# Patient Record
Sex: MALE | Race: WHITE | NOT HISPANIC OR LATINO
[De-identification: names, ages, dates, MRNs, and addresses within clinical notes are randomized per-mention and may not be internally consistent; named-entity substitution may affect disease eponyms.]

---

## 2023-07-09 ENCOUNTER — TRANSCRIPTION ENCOUNTER (OUTPATIENT)
Age: 58
End: 2023-07-09

## 2023-08-16 ENCOUNTER — TRANSCRIPTION ENCOUNTER (OUTPATIENT)
Age: 58
End: 2023-08-16

## 2023-09-26 ENCOUNTER — TRANSCRIPTION ENCOUNTER (OUTPATIENT)
Age: 58
End: 2023-09-26

## 2023-10-06 ENCOUNTER — TRANSCRIPTION ENCOUNTER (OUTPATIENT)
Age: 58
End: 2023-10-06

## 2023-11-17 ENCOUNTER — TRANSCRIPTION ENCOUNTER (OUTPATIENT)
Age: 58
End: 2023-11-17

## 2023-11-27 ENCOUNTER — APPOINTMENT (OUTPATIENT)
Dept: NEPHROLOGY | Facility: CLINIC | Age: 58
End: 2023-11-27
Payer: MEDICARE

## 2023-11-27 VITALS
DIASTOLIC BLOOD PRESSURE: 96 MMHG | HEART RATE: 99 BPM | SYSTOLIC BLOOD PRESSURE: 146 MMHG | BODY MASS INDEX: 31.14 KG/M2 | HEIGHT: 70.5 IN | WEIGHT: 220 LBS | OXYGEN SATURATION: 96 %

## 2023-11-27 DIAGNOSIS — Z86.79 PERSONAL HISTORY OF OTHER DISEASES OF THE CIRCULATORY SYSTEM: ICD-10-CM

## 2023-11-27 PROBLEM — Z00.00 ENCOUNTER FOR PREVENTIVE HEALTH EXAMINATION: Status: ACTIVE | Noted: 2023-11-27

## 2023-11-27 PROCEDURE — 99215 OFFICE O/P EST HI 40 MIN: CPT

## 2023-11-27 RX ORDER — SEVELAMER HYDROCHLORIDE 800 MG/1
800 TABLET, FILM COATED ORAL 3 TIMES DAILY
Qty: 270 | Refills: 3 | Status: ACTIVE | COMMUNITY
Start: 2023-11-27 | End: 1900-01-01

## 2024-01-03 ENCOUNTER — APPOINTMENT (OUTPATIENT)
Dept: NEPHROLOGY | Facility: CLINIC | Age: 59
End: 2024-01-03
Payer: MEDICARE

## 2024-01-03 ENCOUNTER — NON-APPOINTMENT (OUTPATIENT)
Age: 59
End: 2024-01-03

## 2024-01-03 ENCOUNTER — TRANSCRIPTION ENCOUNTER (OUTPATIENT)
Age: 59
End: 2024-01-03

## 2024-01-03 VITALS
OXYGEN SATURATION: 96 % | HEART RATE: 98 BPM | BODY MASS INDEX: 30.58 KG/M2 | WEIGHT: 216 LBS | DIASTOLIC BLOOD PRESSURE: 90 MMHG | HEIGHT: 70.5 IN | SYSTOLIC BLOOD PRESSURE: 130 MMHG

## 2024-01-03 PROCEDURE — 99214 OFFICE O/P EST MOD 30 MIN: CPT

## 2024-01-03 RX ORDER — NICOTINE 21 MG/24HR
14 PATCH, TRANSDERMAL 24 HOURS TRANSDERMAL DAILY
Qty: 90 | Refills: 3 | Status: DISCONTINUED | COMMUNITY
Start: 2023-11-27 | End: 2024-01-03

## 2024-01-03 NOTE — PHYSICAL EXAM
[General Appearance - Alert] : alert [General Appearance - In No Acute Distress] : in no acute distress [General Appearance - Well Nourished] : well nourished [] : no respiratory distress [Respiration, Rhythm And Depth] : normal respiratory rhythm and effort [Exaggerated Use Of Accessory Muscles For Inspiration] : no accessory muscle use [FreeTextEntry1] : inspiratory rhonchi

## 2024-01-03 NOTE — REVIEW OF SYSTEMS
[Cough] : cough [Fever] : no fever [Chills] : no chills [Feeling Poorly] : not feeling poorly [Feeling Tired] : not feeling tired [Chest Pain] : no chest pain [Palpitations] : no palpitations [SOB on Exertion] : no shortness of breath during exertion [Abdominal Pain] : no abdominal pain [Vomiting] : no vomiting [Constipation] : no constipation [Diarrhea] : no diarrhea [Dysuria] : no dysuria [Incontinence] : no incontinence [Hesitancy] : no urinary hesitancy [Nocturia] : no nocturia [Confused] : no confusion

## 2024-01-03 NOTE — HISTORY OF PRESENT ILLNESS
[FreeTextEntry1] : 57-year-old male with PMH of CKD 4-5, BPH, chronic urinary retention with hydronephrosis, DI, and schizophrenia. Patient was admitted in Psych unit then developed diarrhea, weakness x 4 days and 1 episode of vomiting yesterday. Lab with leukocytosis, elevated lactate 3.4, bun 74, cr 4.1. abdominal CT with enteric thickening, mult right renal cyst and distended bladder. Patient was started on antibiotic and IVF. Renal was  consulted further recommendation.  Patient stated has kidney disease for about 4 years, have had chronic catheter James placed in the past. He is following with a nephrologist Kerry Perrin in [place]. Pateint denies urinary retention, dysuria or hematuria. He reported feeling well now, diarrhea subsided and he eating well.  During hospital stay he had episode of hypernatremia with serum sodium 150s. Improved with D5w, amiloride with low sodium and low protein diet.   Patient was discharge on 11/17 11/27  Here today for follow up, seen in company of mother-in-law. He has no complaints. Patient is a resident at the assisted living facility, [facility] in [place]. He reported urinating a lot and have been also drinking a lot of water. Denies urinary retention, sensation of incomplete bladder emptying. Patient use to follow with Dr. Rainey, Dr. Sweeney and Dr. Perrin, but now would like to follow in our office.  He has been off Lithium for more than 5 years. smoke 7-10 cigarettes a day   RBUS 9/5 FINDINGS: Right kidney: 10.8 cm in length. Increased echogenicity. 2.4 cm simple cyst. Dilated renal pelvis but no overt hydronephrosis. Left kidney: 11.7 cm in length. Severe hydronephrosis similar to CT. Increased renal echogenicity. Renal cortical thinning. Urinary bladder: Markedly distended with trabeculated wall and small diverticuli. No filling defects. Prevoid urinary bladder volume: 961 cc.  Postvoid urinary bladder volume: 145 cc. Prostate gland: 3.6 x 3.1 x 4.2 cm, calculated volume of 24 cc.  IMPRESSION:  Distended urinary bladder with bladder trabeculations and small bladder diverticuli. Large urinary bladder postvoid residual volume. Severe left hydronephrosis. Increased renal echogenicity compatible with medical renal disease.  1/3  Seen today with stepmother. He has no complaints, but stepmother reported coughing for about 3 days. Has no fever, cp, or sob.  He saw urologist Dr. Lopez, had US, was told that everything is okay. Result not available to me.  Kidney function stable.   Last visit referral given for transplant evaluation; I think Haroon will do better with a renal transplant than with dialysis (sitting 3-4 hrs treatment 3 times a week) as he is compliant with medication, takes his pills when given to him. I spoke to his Psychiatrist Dr. Lyles who is also think the same. He had appointment but decline evaluation. Today he stated that he would prefer to start HD instead of a renal transplant. He stated that he understands that dialysis is to clean his blood but does not know what a renal transplant is and stated, " I don't want to know, and I don't want it" and tried to walk out of the room.  He would like to have more time to think about vascular evaluation for HD access.

## 2024-02-02 ENCOUNTER — RESULT REVIEW (OUTPATIENT)
Age: 59
End: 2024-02-02

## 2024-03-27 ENCOUNTER — RESULT REVIEW (OUTPATIENT)
Age: 59
End: 2024-03-27

## 2024-04-03 ENCOUNTER — APPOINTMENT (OUTPATIENT)
Dept: NEPHROLOGY | Facility: CLINIC | Age: 59
End: 2024-04-03
Payer: MEDICARE

## 2024-04-03 VITALS
DIASTOLIC BLOOD PRESSURE: 90 MMHG | OXYGEN SATURATION: 98 % | HEIGHT: 70.5 IN | BODY MASS INDEX: 30.58 KG/M2 | HEART RATE: 114 BPM | SYSTOLIC BLOOD PRESSURE: 150 MMHG | WEIGHT: 216 LBS

## 2024-04-03 DIAGNOSIS — K21.9 GASTRO-ESOPHAGEAL REFLUX DISEASE W/OUT ESOPHAGITIS: ICD-10-CM

## 2024-04-03 DIAGNOSIS — F17.200 NICOTINE DEPENDENCE, UNSPECIFIED, UNCOMPLICATED: ICD-10-CM

## 2024-04-03 PROCEDURE — G2211 COMPLEX E/M VISIT ADD ON: CPT

## 2024-04-03 PROCEDURE — 99215 OFFICE O/P EST HI 40 MIN: CPT

## 2024-04-03 RX ORDER — SODIUM BICARBONATE 650 MG/1
650 TABLET ORAL 3 TIMES DAILY
Qty: 540 | Refills: 1 | Status: ACTIVE | COMMUNITY
Start: 2023-11-27 | End: 1900-01-01

## 2024-04-03 RX ORDER — FAMOTIDINE 20 MG/1
20 TABLET, FILM COATED ORAL
Qty: 90 | Refills: 0 | Status: ACTIVE | COMMUNITY
Start: 2023-11-28 | End: 1900-01-01

## 2024-04-03 RX ORDER — CLOZAPINE 50 MG/1
50 TABLET ORAL TWICE DAILY
Refills: 0 | Status: ACTIVE | COMMUNITY

## 2024-04-03 RX ORDER — SODIUM ZIRCONIUM CYCLOSILICATE 10 G/10G
10 POWDER, FOR SUSPENSION ORAL
Refills: 0 | Status: ACTIVE | COMMUNITY

## 2024-04-03 RX ORDER — TAMSULOSIN HYDROCHLORIDE 0.4 MG/1
0.4 CAPSULE ORAL
Refills: 0 | Status: ACTIVE | COMMUNITY

## 2024-04-03 RX ORDER — POLYETHYLENE GLYCOL 3350 17 G/17G
17 POWDER, FOR SOLUTION ORAL
Refills: 0 | Status: ACTIVE | COMMUNITY

## 2024-04-03 RX ORDER — CALCIUM ACETATE 667 MG/1
667 CAPSULE ORAL 3 TIMES DAILY
Refills: 0 | Status: ACTIVE | COMMUNITY

## 2024-04-03 RX ORDER — ATORVASTATIN CALCIUM 20 MG/1
20 TABLET, FILM COATED ORAL
Refills: 0 | Status: ACTIVE | COMMUNITY

## 2024-04-03 RX ORDER — PANTOPRAZOLE SODIUM 40 MG/1
40 GRANULE, DELAYED RELEASE ORAL DAILY
Refills: 0 | Status: ACTIVE | COMMUNITY

## 2024-04-03 RX ORDER — RISPERIDONE 3 MG/1
3 TABLET, FILM COATED ORAL
Refills: 0 | Status: ACTIVE | COMMUNITY

## 2024-04-03 RX ORDER — SENNOSIDES 8.6 MG/1
8.6 TABLET ORAL
Refills: 0 | Status: ACTIVE | COMMUNITY

## 2024-04-03 RX ORDER — FINASTERIDE 5 MG/1
5 TABLET, FILM COATED ORAL DAILY
Refills: 0 | Status: ACTIVE | COMMUNITY

## 2024-04-03 RX ORDER — CLOZAPINE 100 MG/1
100 TABLET ORAL
Refills: 0 | Status: ACTIVE | COMMUNITY

## 2024-04-03 NOTE — HISTORY OF PRESENT ILLNESS
[FreeTextEntry1] : 57-year-old male resident at [facility] [place], with PMH of CKD 4-5, BPH, chronic urinary retention with hydronephrosis, DI, and schizophrenia. Patient was admitted in Psych unit then developed diarrhea, weakness x 4 days and 1 episode of vomiting yesterday. Lab with leukocytosis, elevated lactate 3.4, bun 74, cr 4.1. abdominal CT with enteric thickening, mult right renal cyst and distended bladder. Patient was started on antibiotic and IVF. Renal was  consulted further recommendation.  Patient stated has kidney disease for about 4 years, have had chronic catheter James placed in the past. He is following with a nephrologist Kerry Perrin in [place]. Pateint denies urinary retention, dysuria or hematuria. He reported feeling well now, diarrhea subsided and he eating well.  During hospital stay he had episode of hypernatremia with serum sodium 150s. Improved with D5w, amiloride with low sodium and low protein diet.   Patient was discharge on 11/17   Here today for follow up, seen in company of mother-in-law. He has no complaints. Patient is a resident at the assisted living facility, [facility] in [place]. He reported urinating a lot and have been also drinking a lot of water. Denies urinary retention, sensation of incomplete bladder emptying. Patient use to follow with Dr. Rainey, Dr. Sweeney and Dr. Perrin, but now would like to follow in our office.  He has been off Lithium for more than 5 years. smoke 7-10 cigarettes a day   RBUS 9/5 FINDINGS: Right kidney: 10.8 cm in length. Increased echogenicity. 2.4 cm simple cyst. Dilated renal pelvis but no overt hydronephrosis. Left kidney: 11.7 cm in length. Severe hydronephrosis similar to CT. Increased renal echogenicity. Renal cortical thinning. Urinary bladder: Markedly distended with trabeculated wall and small diverticuli. No filling defects. Prevoid urinary bladder volume: 961 cc.  Postvoid urinary bladder volume: 145 cc. Prostate gland: 3.6 x 3.1 x 4.2 cm, calculated volume of 24 cc.  IMPRESSION:  Distended urinary bladder with bladder trabeculations and small bladder diverticuli. Large urinary bladder postvoid residual volume. Severe left hydronephrosis. Increased renal echogenicity compatible with medical renal disease.  1/3  Seen today with stepmother. He has no complaints, but stepmother reported coughing for about 3 days. Has no fever, cp, or sob.  He saw urologist Dr. Lopez, had US, was told that everything is okay. Result not available to me.  Kidney function stable.   Last visit referral given for transplant evaluation; I think Haroon will do better with a renal transplant than with dialysis (sitting 3-4 hrs treatment 3 times a week) as he is compliant with medication, takes his pills when given to him. I spoke to his Psychiatrist Dr. Lyles who is also think the same. He had appointment but decline evaluation. Today he stated that he would prefer to start HD instead of a renal transplant. He stated that he understands that dialysis is to clean his blood but does not know what a renal transplant is and stated, " I don't want to know, and I don't want it" and tried to walk out of the room.  He would like to have more time to think about vascular evaluation for HD access.   4/3 Patient came today alone without his stepmom. He reported feeling well. Denies sob, cp, palpitation, n/v, dysuria, urinary retention, nocturia.  Recent lab with hypernatremia sNa 146-148. Haroon reported drinking water, could not quantify amount.  He is on amiloride 5 mg.  Kidney function stable with scr ~3s.   BP high in the office.

## 2024-04-03 NOTE — PHYSICAL EXAM
[General Appearance - Alert] : alert [General Appearance - In No Acute Distress] : in no acute distress [Respiration, Rhythm And Depth] : normal respiratory rhythm and effort [] : no respiratory distress [Exaggerated Use Of Accessory Muscles For Inspiration] : no accessory muscle use [Auscultation Breath Sounds / Voice Sounds] : lungs were clear to auscultation bilaterally [Heart Rate And Rhythm] : heart rate was normal and rhythm regular [Heart Sounds] : normal S1 and S2 [Edema] : there was no peripheral edema [Abdomen Soft] : soft [Abdomen Tenderness] : non-tender [Abnormal Walk] : normal gait [Oriented To Time, Place, And Person] : oriented to person, place, and time

## 2024-04-03 NOTE — ASSESSMENT
[FreeTextEntry1] : # CKD stage 4-5, stable Stepmother reported baseline creatinine ~ 2.5-2.9, about 1 yr ago, on hospital chart review, serum creatinine has been ~ 3-4. Likely new baseline stable. Most recent labs 3/6, scr 3.0-3.3. stable  -cause likely obstructive nephropathy, lithium induced - UA bland no hematuria or proteinuria - RBUS 9/5: Severe left hydronephrosis. Increased renal echogenicity compatible with medical renal disease. - Avoid nephrotoxic med. home med reviewed, dc PPI cont with pepcid.  - Today 4/3 Declined renal tx eval and, would like to proceed with HD when indicated. He would like to take more time to discuss vascular eval for access. Referral given last visit, decline evaluation today. - Lab , cbc, cmp, cystatin c, UA, UPCR,  # Nephrogenic DI/Hypernatremia, worsening -  Serum sodium rising 146-148 last 2 labs - Recommend increasing water intake at least 3 litter a day - Low sodium and low protein diet - increase amiloride to 10 mg daily - cont lokelma 10g daily  # Hx of Urinary retention with left Hydronephrosis Reported no retention at the visit, freely voiding. -renal US 9/5 Left HN, - cont flomax 0.4 mg daily - Seen urologist dr. Lopez last Dec. on the last visit. Stepmother stated he was told that everything was okay. should have had follow up appointment this month.   # Active smoker ~ 10 cigarettes a day. Tolerated nicotine patch on last admission. - nicotine patch 14mg/d. Reported not working, cont to smoke, he said " I like to smoke."  # Metabolic acidosis Not improving, bicarb 19 -cont sodium bicarb to 1300 mg tid. Per his med list at the assisted facility he was on 2 tab bid. Will send update med recommendation to the facility.  # CKD-MBD -cont sevelamer 800 mg daily - cont calcium acetate 667 mg daily  -Send PHos, vit D, PTH  # Elevated BP-- HTN No dx of HTN, BP elevated last visit and today 150/90, manual repeat 140/100. -low salt diet - Increase amiloride to 10 mg daily. Dose was decrease to 5 mg on last admission due to hypotensive episode.  - Home BP monitor and bring log on next visit - BP check 2 weeks. will cont to adjust med as needed.   RTC 8 weeks. Lab 1 week prior to the visit

## 2024-04-03 NOTE — REVIEW OF SYSTEMS
[Chills] : no chills [Fever] : no fever [Feeling Tired] : not feeling tired [Feeling Poorly] : not feeling poorly [Chest Pain] : no chest pain [Palpitations] : no palpitations [Cough] : no cough [SOB on Exertion] : no shortness of breath during exertion [Constipation] : no constipation [Diarrhea] : no diarrhea [Incontinence] : no incontinence [Dysuria] : no dysuria [Hesitancy] : no urinary hesitancy [Dizziness] : no dizziness [Nocturia] : no nocturia

## 2024-04-08 LAB
25(OH)D3 SERPL-MCNC: 29.7 NG/ML
ALBUMIN SERPL ELPH-MCNC: 4.4 G/DL
ALP BLD-CCNC: 127 U/L
ALT SERPL-CCNC: 13 U/L
ANION GAP SERPL CALC-SCNC: 17 MMOL/L
AST SERPL-CCNC: 11 U/L
BASOPHILS # BLD AUTO: 0.03 K/UL
BASOPHILS NFR BLD AUTO: 0.2 %
BILIRUB SERPL-MCNC: 0.3 MG/DL
BUN SERPL-MCNC: 37 MG/DL
CALCIUM SERPL-MCNC: 9.9 MG/DL
CALCIUM SERPL-MCNC: 9.9 MG/DL
CHLORIDE SERPL-SCNC: 110 MMOL/L
CO2 SERPL-SCNC: 17 MMOL/L
CREAT SERPL-MCNC: 3.43 MG/DL
CYSTATIN C SERPL-MCNC: 2.33 MG/L
EGFR: 20 ML/MIN/1.73M2
EOSINOPHIL # BLD AUTO: 0 K/UL
EOSINOPHIL NFR BLD AUTO: 0 %
GFR/BSA.PRED SERPLBLD CYS-BASED-ARV: 26 ML/MIN/1.73M2
GLUCOSE SERPL-MCNC: 113 MG/DL
HCT VFR BLD CALC: 44.3 %
HGB BLD-MCNC: 14.1 G/DL
IMM GRANULOCYTES NFR BLD AUTO: 0.8 %
LYMPHOCYTES # BLD AUTO: 0.88 K/UL
LYMPHOCYTES NFR BLD AUTO: 7.1 %
MAN DIFF?: NORMAL
MCHC RBC-ENTMCNC: 27.4 PG
MCHC RBC-ENTMCNC: 31.8 GM/DL
MCV RBC AUTO: 86 FL
MONOCYTES # BLD AUTO: 0.72 K/UL
MONOCYTES NFR BLD AUTO: 5.8 %
NEUTROPHILS # BLD AUTO: 10.64 K/UL
NEUTROPHILS NFR BLD AUTO: 86.1 %
PARATHYROID HORMONE INTACT: 149 PG/ML
PHOSPHATE SERPL-MCNC: 3 MG/DL
PLATELET # BLD AUTO: 188 K/UL
POTASSIUM SERPL-SCNC: 4.7 MMOL/L
PROT SERPL-MCNC: 6.8 G/DL
RBC # BLD: 5.15 M/UL
RBC # FLD: 15.1 %
SODIUM SERPL-SCNC: 144 MMOL/L
WBC # FLD AUTO: 12.37 K/UL

## 2024-05-01 ENCOUNTER — RESULT REVIEW (OUTPATIENT)
Age: 59
End: 2024-05-01

## 2024-05-31 ENCOUNTER — APPOINTMENT (OUTPATIENT)
Dept: NEPHROLOGY | Facility: CLINIC | Age: 59
End: 2024-05-31
Payer: MEDICARE

## 2024-05-31 VITALS
BODY MASS INDEX: 31.43 KG/M2 | OXYGEN SATURATION: 98 % | HEART RATE: 98 BPM | DIASTOLIC BLOOD PRESSURE: 88 MMHG | HEIGHT: 70.5 IN | SYSTOLIC BLOOD PRESSURE: 140 MMHG | WEIGHT: 222 LBS

## 2024-05-31 DIAGNOSIS — R33.9 RETENTION OF URINE, UNSPECIFIED: ICD-10-CM

## 2024-05-31 DIAGNOSIS — N13.30 UNSPECIFIED HYDRONEPHROSIS: ICD-10-CM

## 2024-05-31 DIAGNOSIS — N25.1 NEPHROGENIC DIABETES INSIPIDUS: ICD-10-CM

## 2024-05-31 DIAGNOSIS — E87.20 ACIDOSIS, UNSPECIFIED: ICD-10-CM

## 2024-05-31 PROCEDURE — 99214 OFFICE O/P EST MOD 30 MIN: CPT

## 2024-05-31 NOTE — REVIEW OF SYSTEMS
[Fever] : no fever [Chills] : no chills [Chest Pain] : no chest pain [Palpitations] : no palpitations [Cough] : no cough [SOB on Exertion] : no shortness of breath during exertion [Constipation] : no constipation [Diarrhea] : no diarrhea [Dysuria] : no dysuria [Hesitancy] : no urinary hesitancy [Nocturia] : no nocturia [Dizziness] : no dizziness

## 2024-05-31 NOTE — ASSESSMENT
[FreeTextEntry1] : # CKD stage 4-5, worsening Stepmother reported baseline creatinine ~ 2.5-2.9, about 1 yr ago, on hospital chart review, serum creatinine has been ~ 3-4. Since hospital discharge scr has been mostly in the 3s, progressively rise to 4.02 -cause likely obstructive nephropathy, lithium induced - UA bland no hematuria or proteinuria - RBUS 9/5: Severe left hydronephrosis. Increased renal echogenicity compatible with medical renal disease. - Avoid nephrotoxic med. home med reviewed, cont with pepcid.  -  4/3 and previous visit, he Declined renal tx eval and, would like to proceed with HD when indicated. He would like to have access place when needed.  Did not go for evaluation for vascular access. - Lab  cmp, UA.  IF serum creatinine cont to rise and patient cannot be seen by urology ealier than August, will obtain renal US to rule out urinary obstruction.  - cont to increase hydration  # Nephrogenic DI/Hypernatremia stable -  Serum sodium 141 - cont water intake 3 litter a day - Low sodium and low protein diet - cont amiloride to 10 mg daily - cont lokelma 10g daily  # Hx of Urinary retention with left Hydronephrosis Reported no retention at the visit, freely voiding. -renal US 9/5 Left HN, - cont flomax 0.4 mg daily - Seen urologist dr. Lopez last March.  Stepmother stated he was told that everything was okay, next FU August  # Active smoker ~ 10 cigarettes a day. Tolerated nicotine patch on last admission. - nicotine patch 14mg/d. Reported not working, cont to smoke, he said " I like to smoke."  # Metabolic acidosis, improving Not improving, bicarb 21 -cont sodium bicarb to 1300 mg tid.   # CKD-MBD -cont sevelamer 800 mg daily - cont calcium acetate 667 mg daily  - Phos 3.0, vit D 29,    # Elevated BP-- HTN No dx of HTN, initial /88, manual repeat 130/80  -low salt diet - cont amiloride to 10 mg daily - Home BP monitor and bring log on next visit  RTC 8 weeks. Lab 1 week prior to the visit

## 2024-05-31 NOTE — PHYSICAL EXAM
[General Appearance - Alert] : alert [General Appearance - In No Acute Distress] : in no acute distress [] : no respiratory distress [Respiration, Rhythm And Depth] : normal respiratory rhythm and effort [Exaggerated Use Of Accessory Muscles For Inspiration] : no accessory muscle use [Auscultation Breath Sounds / Voice Sounds] : lungs were clear to auscultation bilaterally [Heart Rate And Rhythm] : heart rate was normal and rhythm regular [Heart Sounds] : normal S1 and S2 [Edema] : there was no peripheral edema [Abdomen Soft] : soft [Abdomen Tenderness] : non-tender [Abnormal Walk] : normal gait

## 2024-05-31 NOTE — HISTORY OF PRESENT ILLNESS
[FreeTextEntry1] : 57-year-old male with PMH of CKD 4-5, BPH, chronic urinary retention with hydronephrosis, DI, and schizophrenia. Patient was admitted in Psych unit then developed diarrhea, weakness x 4 days and 1 episode of vomiting yesterday. Lab with leukocytosis, elevated lactate 3.4, bun 74, cr 4.1. abdominal CT with enteric thickening, mult right renal cyst and distended bladder. Patient was started on antibiotic and IVF. Renal was  consulted further recommendation.  Patient stated has kidney disease for about 4 years, have had chronic catheter James placed in the past. He is following with a nephrologist Kerry Perrin in [place]. Pateint denies urinary retention, dysuria or hematuria. He reported feeling well now, diarrhea subsided and he eating well.  During hospital stay he had episode of hypernatremia with serum sodium 150s. Improved with D5w, amiloride with low sodium and low protein diet.   Patient was discharge on 11/17   Here today for follow up, seen in company of mother-in-law. He has no complaints. Patient is a resident at the assisted living facility, [facility] in [place]. He reported urinating a lot and have been also drinking a lot of water. Denies urinary retention, sensation of incomplete bladder emptying. Patient use to follow with Dr. Rainey, Dr. Sweeney and Dr. Perrin, but now would like to follow in our office.  He has been off Lithium for more than 5 years. smoke 7-10 cigarettes a day   RBUS 9/5 FINDINGS: Right kidney: 10.8 cm in length. Increased echogenicity. 2.4 cm simple cyst. Dilated renal pelvis but no overt hydronephrosis. Left kidney: 11.7 cm in length. Severe hydronephrosis similar to CT. Increased renal echogenicity. Renal cortical thinning. Urinary bladder: Markedly distended with trabeculated wall and small diverticuli. No filling defects. Prevoid urinary bladder volume: 961 cc.  Postvoid urinary bladder volume: 145 cc. Prostate gland: 3.6 x 3.1 x 4.2 cm, calculated volume of 24 cc.  IMPRESSION:  Distended urinary bladder with bladder trabeculations and small bladder diverticuli. Large urinary bladder postvoid residual volume. Severe left hydronephrosis. Increased renal echogenicity compatible with medical renal disease.  1/3  Seen today with stepmother. He has no complaints, but stepmother reported coughing for about 3 days. Has no fever, cp, or sob.  He saw urologist Dr. Lopez, had US, was told that everything is okay. Result not available to me.  Kidney function stable.   Last visit referral given for transplant evaluation; I think Haroon will do better with a renal transplant than with dialysis (sitting 3-4 hrs treatment 3 times a week) as he is compliant with medication, takes his pills when given to him. I spoke to his Psychiatrist Dr. Lyles who is also think the same. He had appointment but decline evaluation. Today he stated that he would prefer to start HD instead of a renal transplant. He stated that he understands that dialysis is to clean his blood but does not know what a renal transplant is and stated, " I don't want to know, and I don't want it" and tried to walk out of the room.  He would like to have more time to think about vascular evaluation for HD access.   4/3 Patient came today alone without his stepmom. He reported feeling well. Denies sob, cp, palpitation, n/v, dysuria, urinary retention, nocturia.  Recent lab with hypernatremia sNa 146-148. Haroon reported drinking water, could not quantify amount.  He is on amiloride 5 mg.  Kidney function stable with scr ~3s.    4/31  Patient seen today with stepmom. he was admitted in the hospital recently for psychosis with auditory hallucinations. He was discharged about 24-48 hrs after.   Serum creatinine rising most recent 4.02 from baseline 3s. progressively rised from 3.2-3.6-3.8. Patient report feeling well. He denies change in urinary frequency or abdominal discomfort. He stated drinking water.  Not taking any new medication.  He saw Dr. Nj in March, mom reported that no urinary retention reported during evalution, had sonogram done at the visit, his next urology appointment is in August.

## 2024-06-05 DIAGNOSIS — E83.9 CHRONIC KIDNEY DISEASE, UNSPECIFIED: ICD-10-CM

## 2024-06-05 DIAGNOSIS — M89.9 CHRONIC KIDNEY DISEASE, UNSPECIFIED: ICD-10-CM

## 2024-06-05 DIAGNOSIS — N18.9 CHRONIC KIDNEY DISEASE, UNSPECIFIED: ICD-10-CM

## 2024-06-05 DIAGNOSIS — N18.5 CHRONIC KIDNEY DISEASE, STAGE 5: ICD-10-CM

## 2024-06-05 LAB
ANION GAP SERPL CALC-SCNC: 13 MMOL/L
APPEARANCE: CLEAR
BACTERIA: NEGATIVE /HPF
BILIRUBIN URINE: NEGATIVE
BLOOD URINE: NEGATIVE
BUN SERPL-MCNC: 41 MG/DL
CALCIUM SERPL-MCNC: 9.3 MG/DL
CAST: 1 /LPF
CHLORIDE SERPL-SCNC: 109 MMOL/L
CO2 SERPL-SCNC: 17 MMOL/L
COLOR: YELLOW
CREAT SERPL-MCNC: 3.38 MG/DL
EGFR: 20 ML/MIN/1.73M2
EPITHELIAL CELLS: 0 /HPF
GLUCOSE QUALITATIVE U: NEGATIVE MG/DL
GLUCOSE SERPL-MCNC: 126 MG/DL
KETONES URINE: NEGATIVE MG/DL
LEUKOCYTE ESTERASE URINE: NEGATIVE
MICROSCOPIC-UA: NORMAL
NITRITE URINE: NEGATIVE
PH URINE: 6.5
POTASSIUM SERPL-SCNC: 4.4 MMOL/L
PROTEIN URINE: NEGATIVE MG/DL
RED BLOOD CELLS URINE: 0 /HPF
SODIUM SERPL-SCNC: 139 MMOL/L
SPECIFIC GRAVITY URINE: 1.01
UROBILINOGEN URINE: 0.2 MG/DL
WHITE BLOOD CELLS URINE: 0 /HPF

## 2024-06-06 RX ORDER — AMILORIDE HYDROCHLORIDE 5 MG/1
5 TABLET ORAL
Qty: 180 | Refills: 3 | Status: ACTIVE | COMMUNITY
Start: 2023-11-27 | End: 1900-01-01

## 2024-07-03 ENCOUNTER — RESULT REVIEW (OUTPATIENT)
Age: 59
End: 2024-07-03

## 2024-07-10 ENCOUNTER — APPOINTMENT (OUTPATIENT)
Dept: NEPHROLOGY | Facility: CLINIC | Age: 59
End: 2024-07-10
Payer: MEDICARE

## 2024-07-10 VITALS
OXYGEN SATURATION: 96 % | SYSTOLIC BLOOD PRESSURE: 146 MMHG | DIASTOLIC BLOOD PRESSURE: 100 MMHG | HEART RATE: 106 BPM | WEIGHT: 220 LBS | HEIGHT: 70.5 IN | BODY MASS INDEX: 31.14 KG/M2

## 2024-07-10 DIAGNOSIS — R33.9 RETENTION OF URINE, UNSPECIFIED: ICD-10-CM

## 2024-07-10 DIAGNOSIS — E83.9 CHRONIC KIDNEY DISEASE, UNSPECIFIED: ICD-10-CM

## 2024-07-10 DIAGNOSIS — N13.30 UNSPECIFIED HYDRONEPHROSIS: ICD-10-CM

## 2024-07-10 DIAGNOSIS — E87.20 ACIDOSIS, UNSPECIFIED: ICD-10-CM

## 2024-07-10 DIAGNOSIS — M89.9 CHRONIC KIDNEY DISEASE, UNSPECIFIED: ICD-10-CM

## 2024-07-10 DIAGNOSIS — N18.9 CHRONIC KIDNEY DISEASE, UNSPECIFIED: ICD-10-CM

## 2024-07-10 DIAGNOSIS — N25.1 NEPHROGENIC DIABETES INSIPIDUS: ICD-10-CM

## 2024-07-10 DIAGNOSIS — N18.5 CHRONIC KIDNEY DISEASE, STAGE 5: ICD-10-CM

## 2024-07-10 PROCEDURE — 99214 OFFICE O/P EST MOD 30 MIN: CPT

## 2024-09-20 ENCOUNTER — APPOINTMENT (OUTPATIENT)
Dept: NEPHROLOGY | Facility: CLINIC | Age: 59
End: 2024-09-20
Payer: MEDICARE

## 2024-09-20 VITALS
WEIGHT: 225 LBS | HEART RATE: 104 BPM | HEIGHT: 70.5 IN | DIASTOLIC BLOOD PRESSURE: 110 MMHG | BODY MASS INDEX: 31.85 KG/M2 | SYSTOLIC BLOOD PRESSURE: 160 MMHG | OXYGEN SATURATION: 98 %

## 2024-09-20 DIAGNOSIS — N18.9 CHRONIC KIDNEY DISEASE, UNSPECIFIED: ICD-10-CM

## 2024-09-20 DIAGNOSIS — N25.1 NEPHROGENIC DIABETES INSIPIDUS: ICD-10-CM

## 2024-09-20 DIAGNOSIS — M89.9 CHRONIC KIDNEY DISEASE, UNSPECIFIED: ICD-10-CM

## 2024-09-20 DIAGNOSIS — E87.20 ACIDOSIS, UNSPECIFIED: ICD-10-CM

## 2024-09-20 DIAGNOSIS — E83.9 CHRONIC KIDNEY DISEASE, UNSPECIFIED: ICD-10-CM

## 2024-09-20 DIAGNOSIS — N13.30 UNSPECIFIED HYDRONEPHROSIS: ICD-10-CM

## 2024-09-20 DIAGNOSIS — N18.5 CHRONIC KIDNEY DISEASE, STAGE 5: ICD-10-CM

## 2024-09-20 PROCEDURE — 99214 OFFICE O/P EST MOD 30 MIN: CPT

## 2024-09-20 RX ORDER — CLOZAPINE 200 MG/1
200 TABLET ORAL DAILY
Refills: 0 | Status: ACTIVE | COMMUNITY

## 2024-09-20 NOTE — HISTORY OF PRESENT ILLNESS
[FreeTextEntry1] : 57-year-old male with PMH of CKD 4-5, BPH, chronic urinary retention with hydronephrosis, DI, and schizophrenia. Patient was admitted in Psych unit then developed diarrhea, weakness x 4 days and 1 episode of vomiting yesterday. Lab with leukocytosis, elevated lactate 3.4, bun 74, cr 4.1. abdominal CT with enteric thickening, mult right renal cyst and distended bladder. Patient was started on antibiotic and IVF. Renal was  consulted further recommendation.  Patient stated has kidney disease for about 4 years, have had chronic catheter James placed in the past. He is following with a nephrologist Kerry Perrin in [place]. Pateint denies urinary retention, dysuria or hematuria. He reported feeling well now, diarrhea subsided and he eating well.  During hospital stay he had episode of hypernatremia with serum sodium 150s. Improved with D5w, amiloride with low sodium and low protein diet.   Patient was discharge on 11/17   Here today for follow up, seen in company of mother-in-law. He has no complaints. Patient is a resident at the assisted living facility, [facility] in [place]. He reported urinating a lot and have been also drinking a lot of water. Denies urinary retention, sensation of incomplete bladder emptying. Patient use to follow with Dr. Rainey, Dr. Sweeney and Dr. Perrin, but now would like to follow in our office.  He has been off Lithium for more than 5 years. smoke 7-10 cigarettes a day   RBUS 9/5 FINDINGS: Right kidney: 10.8 cm in length. Increased echogenicity. 2.4 cm simple cyst. Dilated renal pelvis but no overt hydronephrosis. Left kidney: 11.7 cm in length. Severe hydronephrosis similar to CT. Increased renal echogenicity. Renal cortical thinning. Urinary bladder: Markedly distended with trabeculated wall and small diverticuli. No filling defects. Prevoid urinary bladder volume: 961 cc.  Postvoid urinary bladder volume: 145 cc. Prostate gland: 3.6 x 3.1 x 4.2 cm, calculated volume of 24 cc.  IMPRESSION:  Distended urinary bladder with bladder trabeculations and small bladder diverticuli. Large urinary bladder postvoid residual volume. Severe left hydronephrosis. Increased renal echogenicity compatible with medical renal disease.  1/3  Seen today with stepmother. He has no complaints, but stepmother reported coughing for about 3 days. Has no fever, cp, or sob.  He saw urologist Dr. Lopez, had US, was told that everything is okay. Result not available to me.  Kidney function stable.   Last visit referral given for transplant evaluation; I think Haroon will do better with a renal transplant than with dialysis (sitting 3-4 hrs treatment 3 times a week) as he is compliant with medication, takes his pills when given to him. I spoke to his Psychiatrist Dr. Lyles who is also think the same. He had appointment but decline evaluation. Today he stated that he would prefer to start HD instead of a renal transplant. He stated that he understands that dialysis is to clean his blood but does not know what a renal transplant is and stated, " I don't want to know, and I don't want it" and tried to walk out of the room.  He would like to have more time to think about vascular evaluation for HD access.   4/3 Patient came today alone without his stepmom. He reported feeling well. Denies sob, cp, palpitation, n/v, dysuria, urinary retention, nocturia.  Recent lab with hypernatremia sNa 146-148. Haroon reported drinking water, could not quantify amount.  He is on amiloride 5 mg.  Kidney function stable with scr ~3s.    5/31  Patient seen today with stepmom. he was admitted in the hospital recently for psychosis with auditory hallucinations. He was discharged about 24-48 hrs after.   Serum creatinine rising most recent 4.02 from baseline 3s. progressively rised from 3.2-3.6-3.8. Patient report feeling well. He denies change in urinary frequency or abdominal discomfort. He stated drinking water.  Not taking any new medication.  He saw Dr. Nj in March, mom reported that no urinary retention reported during evalution, had sonogram done at the visit, his next urology appointment is in August.    7/10  He is complaining of not feeling well, describe vague abdominal discomfort but denies pain. He denies diarrhea, nausea or vomiting, dysuria. His mom said he did not have lunch today. He had lab drown today, will follow up.   Lab 6/12 reviewed and discussed, kidney function stable. BP mildly elevated, manual repeat 130/75.   9/20  Patient was admitted twice in Psych unit since last visit, most recent visit this month. His clozapine dose was increased.   serum creatinine increased 3.5 from 3.3, egfr 18-19ml/mn, Na 145 Sept 11th.  He stated feeling well and has no complaint. Report voiding freely.  Per his stepmom, had urology fu recently and evaluation was unremarkable.

## 2024-09-20 NOTE — PHYSICAL EXAM
[General Appearance - Alert] : alert [General Appearance - In No Acute Distress] : in no acute distress [Heart Rate And Rhythm] : heart rate was normal and rhythm regular [Heart Sounds] : normal S1 and S2 [Murmurs] : no murmurs [Edema] : there was no peripheral edema [Abdomen Soft] : soft [Abdomen Tenderness] : non-tender [Abnormal Walk] : normal gait [Oriented To Time, Place, And Person] : oriented to person, place, and time

## 2024-09-20 NOTE — ASSESSMENT
[FreeTextEntry1] : # CKD stage 4-5, worsening -scr 3.5 from baseline 3.3 this yr. -cause likely obstructive nephropathy, lithium induced - UA bland no hematuria or proteinuria same on repeat - RBUS 9/5: Severe left hydronephrosis. Increased renal echogenicity compatible with medical renal disease.  - Avoid nephrotoxic med. home med reviewed, cont with pepcid. - 4/3 and previous visit, he Declined renal Tx eval and, would like to proceed with HD when indicated. He would like to have access place when HD is needed. Refuse evaluation for vascular access. - cont to increase hydration - Obtain updated med list from group home - lab cbc, cmp, ua, cystatin c , phos  # Nephrogenic DI/Hypernatremia stable # Hyperkalemia - Serum sodium 145 - Fluid intake reinforced, water intake ~ 3 litter a day - Low sodium and low protein diet - cont amiloride to 10 mg daily - cont lokelma 10g daily  # Hx of Urinary retention with left Hydronephrosis Reported no retention at the visit, freely voiding. -renal US 9/5/23 Left HN, - cont flomax 0.4 mg daily -Has urology fu in August, Report had urology fu with unremarkable evaluation.  Will obtain sonogram report from urology  # Active smoker ~ 10 cigarettes a day. Tolerated nicotine patch on last admission. -Previous visit, gave him nicotine patch 14mg/d. Reported not working, cont to smoke, he said " I like to smoke."  # Metabolic acidosis -cont sodium bicarb to 1300 mg tid.  # CKD-MBD -cont sevelamer 800 mg daily - cont calcium acetate 667 mg daily - Phos 3.0, vit D 29, . Repeat phos and dc calcium acetate if phos stable and calcium wnl - will obtain med   # HTN No dx of HTN, initial /110, manual repeat 140/90, did not take his med this morning,  -low salt diet - cont amiloride to 10 mg daily - Home BP monitor and bring log on next visit

## 2024-09-24 LAB
ALBUMIN SERPL ELPH-MCNC: 4.3 G/DL
ALP BLD-CCNC: 125 U/L
ALT SERPL-CCNC: 10 U/L
ANION GAP SERPL CALC-SCNC: 15 MMOL/L
APPEARANCE: CLEAR
AST SERPL-CCNC: 11 U/L
BACTERIA: NEGATIVE /HPF
BILIRUB SERPL-MCNC: 0.2 MG/DL
BILIRUBIN URINE: NEGATIVE
BLOOD URINE: NEGATIVE
BUN SERPL-MCNC: 43 MG/DL
CALCIUM SERPL-MCNC: 9.4 MG/DL
CAST: 1 /LPF
CHLORIDE SERPL-SCNC: 112 MMOL/L
CO2 SERPL-SCNC: 16 MMOL/L
COLOR: YELLOW
CREAT SERPL-MCNC: 3.44 MG/DL
CYSTATIN C SERPL-MCNC: 2.09 MG/L
EGFR: 20 ML/MIN/1.73M2
EPITHELIAL CELLS: 0 /HPF
GFR/BSA.PRED SERPLBLD CYS-BASED-ARV: 30 ML/MIN/1.73M2
GLUCOSE QUALITATIVE U: NEGATIVE MG/DL
GLUCOSE SERPL-MCNC: 165 MG/DL
HCT VFR BLD CALC: 42.5 %
HGB BLD-MCNC: 13.5 G/DL
KETONES URINE: NEGATIVE MG/DL
LEUKOCYTE ESTERASE URINE: ABNORMAL
MCHC RBC-ENTMCNC: 27.9 PG
MCHC RBC-ENTMCNC: 31.8 GM/DL
MCV RBC AUTO: 87.8 FL
MICROSCOPIC-UA: NORMAL
NITRITE URINE: NEGATIVE
PH URINE: 6.5
PHOSPHATE SERPL-MCNC: 2.7 MG/DL
PLATELET # BLD AUTO: 188 K/UL
POTASSIUM SERPL-SCNC: 4.8 MMOL/L
PROT SERPL-MCNC: 6.8 G/DL
PROTEIN URINE: NORMAL MG/DL
RBC # BLD: 4.84 M/UL
RBC # FLD: 14.2 %
RED BLOOD CELLS URINE: 0 /HPF
SODIUM SERPL-SCNC: 143 MMOL/L
SPECIFIC GRAVITY URINE: 1
UROBILINOGEN URINE: 0.2 MG/DL
WBC # FLD AUTO: 14.12 K/UL
WHITE BLOOD CELLS URINE: 1 /HPF

## 2024-09-30 DIAGNOSIS — M89.9 CHRONIC KIDNEY DISEASE, UNSPECIFIED: ICD-10-CM

## 2024-09-30 DIAGNOSIS — N18.9 CHRONIC KIDNEY DISEASE, UNSPECIFIED: ICD-10-CM

## 2024-09-30 DIAGNOSIS — N25.1 NEPHROGENIC DIABETES INSIPIDUS: ICD-10-CM

## 2024-09-30 DIAGNOSIS — E87.20 ACIDOSIS, UNSPECIFIED: ICD-10-CM

## 2024-09-30 DIAGNOSIS — N13.30 UNSPECIFIED HYDRONEPHROSIS: ICD-10-CM

## 2024-09-30 DIAGNOSIS — N18.5 CHRONIC KIDNEY DISEASE, STAGE 5: ICD-10-CM

## 2024-09-30 DIAGNOSIS — R33.9 RETENTION OF URINE, UNSPECIFIED: ICD-10-CM

## 2024-09-30 DIAGNOSIS — E83.9 CHRONIC KIDNEY DISEASE, UNSPECIFIED: ICD-10-CM

## 2024-11-22 ENCOUNTER — APPOINTMENT (OUTPATIENT)
Dept: NEPHROLOGY | Facility: CLINIC | Age: 59
End: 2024-11-22
Payer: MEDICARE

## 2024-11-22 VITALS
SYSTOLIC BLOOD PRESSURE: 124 MMHG | HEIGHT: 70.5 IN | DIASTOLIC BLOOD PRESSURE: 90 MMHG | WEIGHT: 224 LBS | BODY MASS INDEX: 31.71 KG/M2 | HEART RATE: 96 BPM | OXYGEN SATURATION: 98 %

## 2024-11-22 DIAGNOSIS — N25.1 NEPHROGENIC DIABETES INSIPIDUS: ICD-10-CM

## 2024-11-22 DIAGNOSIS — E87.20 ACIDOSIS, UNSPECIFIED: ICD-10-CM

## 2024-11-22 DIAGNOSIS — N18.5 CHRONIC KIDNEY DISEASE, STAGE 5: ICD-10-CM

## 2024-11-22 DIAGNOSIS — Z80.0 FAMILY HISTORY OF MALIGNANT NEOPLASM OF DIGESTIVE ORGANS: ICD-10-CM

## 2024-11-22 PROCEDURE — 99214 OFFICE O/P EST MOD 30 MIN: CPT

## 2025-01-15 DIAGNOSIS — N25.1 NEPHROGENIC DIABETES INSIPIDUS: ICD-10-CM

## 2025-01-15 DIAGNOSIS — R33.9 RETENTION OF URINE, UNSPECIFIED: ICD-10-CM

## 2025-01-23 ENCOUNTER — APPOINTMENT (OUTPATIENT)
Dept: NEPHROLOGY | Facility: CLINIC | Age: 60
End: 2025-01-23
Payer: MEDICARE

## 2025-01-23 ENCOUNTER — RESULT REVIEW (OUTPATIENT)
Age: 60
End: 2025-01-23

## 2025-01-23 VITALS
WEIGHT: 231 LBS | DIASTOLIC BLOOD PRESSURE: 80 MMHG | OXYGEN SATURATION: 99 % | HEIGHT: 70 IN | BODY MASS INDEX: 33.07 KG/M2 | HEART RATE: 77 BPM | SYSTOLIC BLOOD PRESSURE: 130 MMHG

## 2025-01-23 PROCEDURE — 99214 OFFICE O/P EST MOD 30 MIN: CPT

## 2025-01-24 RX ORDER — CALCITRIOL 0.25 UG/1
0.25 CAPSULE, LIQUID FILLED ORAL
Qty: 90 | Refills: 0 | Status: ACTIVE | COMMUNITY
Start: 2025-01-24 | End: 1900-01-01

## 2025-01-30 DIAGNOSIS — R33.9 RETENTION OF URINE, UNSPECIFIED: ICD-10-CM

## 2025-01-30 DIAGNOSIS — N18.9 CHRONIC KIDNEY DISEASE, UNSPECIFIED: ICD-10-CM

## 2025-01-30 DIAGNOSIS — M89.9 CHRONIC KIDNEY DISEASE, UNSPECIFIED: ICD-10-CM

## 2025-01-30 DIAGNOSIS — E87.20 ACIDOSIS, UNSPECIFIED: ICD-10-CM

## 2025-01-30 DIAGNOSIS — N18.5 CHRONIC KIDNEY DISEASE, STAGE 5: ICD-10-CM

## 2025-01-30 DIAGNOSIS — N13.30 UNSPECIFIED HYDRONEPHROSIS: ICD-10-CM

## 2025-01-30 DIAGNOSIS — N25.1 NEPHROGENIC DIABETES INSIPIDUS: ICD-10-CM

## 2025-01-30 DIAGNOSIS — E83.9 CHRONIC KIDNEY DISEASE, UNSPECIFIED: ICD-10-CM

## 2025-03-20 ENCOUNTER — APPOINTMENT (OUTPATIENT)
Dept: NEPHROLOGY | Facility: CLINIC | Age: 60
End: 2025-03-20

## 2025-04-16 ENCOUNTER — TRANSCRIPTION ENCOUNTER (OUTPATIENT)
Age: 60
End: 2025-04-16

## 2025-04-18 ENCOUNTER — TRANSCRIPTION ENCOUNTER (OUTPATIENT)
Age: 60
End: 2025-04-18

## 2025-04-24 ENCOUNTER — APPOINTMENT (OUTPATIENT)
Dept: NEPHROLOGY | Facility: CLINIC | Age: 60
End: 2025-04-24
Payer: MEDICARE

## 2025-04-24 VITALS — WEIGHT: 227 LBS | BODY MASS INDEX: 32.57 KG/M2

## 2025-04-24 VITALS — DIASTOLIC BLOOD PRESSURE: 80 MMHG | SYSTOLIC BLOOD PRESSURE: 126 MMHG | HEIGHT: 70 IN

## 2025-04-24 DIAGNOSIS — N13.30 UNSPECIFIED HYDRONEPHROSIS: ICD-10-CM

## 2025-04-24 DIAGNOSIS — N18.9 CHRONIC KIDNEY DISEASE, UNSPECIFIED: ICD-10-CM

## 2025-04-24 DIAGNOSIS — E87.20 ACIDOSIS, UNSPECIFIED: ICD-10-CM

## 2025-04-24 DIAGNOSIS — N25.1 NEPHROGENIC DIABETES INSIPIDUS: ICD-10-CM

## 2025-04-24 DIAGNOSIS — N18.5 CHRONIC KIDNEY DISEASE, STAGE 5: ICD-10-CM

## 2025-04-24 DIAGNOSIS — R33.9 RETENTION OF URINE, UNSPECIFIED: ICD-10-CM

## 2025-04-24 DIAGNOSIS — F17.200 NICOTINE DEPENDENCE, UNSPECIFIED, UNCOMPLICATED: ICD-10-CM

## 2025-04-24 DIAGNOSIS — E83.9 CHRONIC KIDNEY DISEASE, UNSPECIFIED: ICD-10-CM

## 2025-04-24 DIAGNOSIS — M89.9 CHRONIC KIDNEY DISEASE, UNSPECIFIED: ICD-10-CM

## 2025-04-24 PROCEDURE — G2211 COMPLEX E/M VISIT ADD ON: CPT

## 2025-04-24 PROCEDURE — 99214 OFFICE O/P EST MOD 30 MIN: CPT

## 2025-04-24 RX ORDER — SEVELAMER HYDROCHLORIDE 800 MG/1
800 TABLET, FILM COATED ORAL 3 TIMES DAILY
Refills: 0 | Status: DISCONTINUED | COMMUNITY

## 2025-04-24 RX ORDER — PANTOPRAZOLE 40 MG/1
40 TABLET, DELAYED RELEASE ORAL
Refills: 0 | Status: DISCONTINUED | COMMUNITY

## 2025-06-24 ENCOUNTER — APPOINTMENT (OUTPATIENT)
Dept: NEPHROLOGY | Facility: CLINIC | Age: 60
End: 2025-06-24
Payer: MEDICARE

## 2025-06-24 VITALS
HEART RATE: 95 BPM | DIASTOLIC BLOOD PRESSURE: 90 MMHG | HEIGHT: 70 IN | BODY MASS INDEX: 33.21 KG/M2 | WEIGHT: 232 LBS | SYSTOLIC BLOOD PRESSURE: 120 MMHG | OXYGEN SATURATION: 97 %

## 2025-06-24 PROBLEM — L98.9 NON-HEALING SKIN LESION: Status: ACTIVE | Noted: 2025-06-24

## 2025-06-24 PROBLEM — N18.9 CKD (CHRONIC KIDNEY DISEASE): Status: ACTIVE | Noted: 2025-06-24

## 2025-06-24 LAB
25(OH)D3 SERPL-MCNC: 24.3 NG/ML
ALBUMIN SERPL ELPH-MCNC: 4 G/DL
ALP BLD-CCNC: 116 U/L
ALT SERPL-CCNC: 17 U/L
ANION GAP SERPL CALC-SCNC: 15 MMOL/L
AST SERPL-CCNC: 12 U/L
BASOPHILS # BLD AUTO: 0.04 K/UL
BASOPHILS NFR BLD AUTO: 0.3 %
BILIRUB SERPL-MCNC: 0.3 MG/DL
BUN SERPL-MCNC: 39 MG/DL
CALCIUM SERPL-MCNC: 9.4 MG/DL
CHLORIDE SERPL-SCNC: 113 MMOL/L
CO2 SERPL-SCNC: 15 MMOL/L
CREAT SERPL-MCNC: 3.65 MG/DL
CYSTATIN C SERPL-MCNC: 2.31 MG/L
EGFRCR SERPLBLD CKD-EPI 2021: 18 ML/MIN/1.73M2
EOSINOPHIL # BLD AUTO: 0 K/UL
EOSINOPHIL NFR BLD AUTO: 0 %
GFR/BSA.PRED SERPLBLD CYS-BASED-ARV: 26 ML/MIN/1.73M2
GLUCOSE SERPL-MCNC: 123 MG/DL
HCT VFR BLD CALC: 40 %
HGB BLD-MCNC: 12.3 G/DL
IMM GRANULOCYTES NFR BLD AUTO: 0.6 %
LYMPHOCYTES # BLD AUTO: 1.08 K/UL
LYMPHOCYTES NFR BLD AUTO: 9.2 %
MAN DIFF?: NORMAL
MCHC RBC-ENTMCNC: 27.5 PG
MCHC RBC-ENTMCNC: 30.8 G/DL
MCV RBC AUTO: 89.3 FL
MONOCYTES # BLD AUTO: 0.85 K/UL
MONOCYTES NFR BLD AUTO: 7.2 %
NEUTROPHILS # BLD AUTO: 9.73 K/UL
NEUTROPHILS NFR BLD AUTO: 82.7 %
PARATHYROID HORMONE INTACT: 198 PG/ML
PHOSPHATE SERPL-MCNC: 3.3 MG/DL
PLATELET # BLD AUTO: 166 K/UL
POTASSIUM SERPL-SCNC: 4.2 MMOL/L
PROT SERPL-MCNC: 6.4 G/DL
RBC # BLD: 4.48 M/UL
RBC # FLD: 15.2 %
SODIUM SERPL-SCNC: 143 MMOL/L
WBC # FLD AUTO: 11.77 K/UL

## 2025-06-24 PROCEDURE — 99214 OFFICE O/P EST MOD 30 MIN: CPT

## 2025-06-24 PROCEDURE — G2211 COMPLEX E/M VISIT ADD ON: CPT

## 2025-06-25 RX ORDER — RISPERIDONE 2 MG/1
2 TABLET, FILM COATED ORAL
Refills: 0 | Status: ACTIVE | COMMUNITY

## 2025-06-25 RX ORDER — CALCIUM ACETATE 667 MG/1
667 CAPSULE ORAL
Refills: 0 | Status: ACTIVE | COMMUNITY
Start: 2025-06-25

## 2025-06-25 RX ORDER — PANTOPRAZOLE SODIUM 40 MG/1
40 GRANULE, DELAYED RELEASE ORAL
Refills: 0 | Status: ACTIVE | COMMUNITY

## 2025-06-25 RX ORDER — SODIUM BICARBONATE 650 MG/1
650 TABLET ORAL
Refills: 0 | Status: ACTIVE | COMMUNITY

## 2025-06-25 RX ORDER — FAMOTIDINE 20 MG/1
20 TABLET, FILM COATED ORAL
Refills: 0 | Status: ACTIVE | COMMUNITY

## 2025-07-25 ENCOUNTER — APPOINTMENT (OUTPATIENT)
Dept: PULMONOLOGY | Facility: CLINIC | Age: 60
End: 2025-07-25
Payer: MEDICARE

## 2025-07-25 VITALS
BODY MASS INDEX: 33.21 KG/M2 | OXYGEN SATURATION: 98 % | DIASTOLIC BLOOD PRESSURE: 79 MMHG | TEMPERATURE: 97 F | WEIGHT: 232 LBS | SYSTOLIC BLOOD PRESSURE: 129 MMHG | HEIGHT: 70 IN | HEART RATE: 96 BPM

## 2025-07-25 DIAGNOSIS — G47.33 OBSTRUCTIVE SLEEP APNEA (ADULT) (PEDIATRIC): ICD-10-CM

## 2025-07-25 DIAGNOSIS — F17.200 NICOTINE DEPENDENCE, UNSPECIFIED, UNCOMPLICATED: ICD-10-CM

## 2025-07-25 DIAGNOSIS — R05.3 CHRONIC COUGH: ICD-10-CM

## 2025-07-25 PROCEDURE — 99406 BEHAV CHNG SMOKING 3-10 MIN: CPT

## 2025-07-25 PROCEDURE — 99204 OFFICE O/P NEW MOD 45 MIN: CPT | Mod: 25

## 2025-07-25 RX ORDER — UMECLIDINIUM BROMIDE AND VILANTEROL TRIFENATATE 62.5; 25 UG/1; UG/1
62.5-25 POWDER RESPIRATORY (INHALATION)
Qty: 1 | Refills: 3 | Status: ACTIVE | COMMUNITY
Start: 2025-07-25 | End: 1900-01-01

## 2025-08-26 ENCOUNTER — APPOINTMENT (OUTPATIENT)
Dept: PULMONOLOGY | Facility: CLINIC | Age: 60
End: 2025-08-26
Payer: MEDICARE

## 2025-08-26 ENCOUNTER — APPOINTMENT (OUTPATIENT)
Dept: NEPHROLOGY | Facility: CLINIC | Age: 60
End: 2025-08-26
Payer: MEDICARE

## 2025-08-26 VITALS
WEIGHT: 232 LBS | TEMPERATURE: 97 F | OXYGEN SATURATION: 99 % | HEIGHT: 70 IN | SYSTOLIC BLOOD PRESSURE: 135 MMHG | HEART RATE: 91 BPM | BODY MASS INDEX: 33.21 KG/M2 | DIASTOLIC BLOOD PRESSURE: 89 MMHG

## 2025-08-26 VITALS
DIASTOLIC BLOOD PRESSURE: 78 MMHG | OXYGEN SATURATION: 98 % | BODY MASS INDEX: 33.21 KG/M2 | HEART RATE: 97 BPM | WEIGHT: 232 LBS | HEIGHT: 70 IN | SYSTOLIC BLOOD PRESSURE: 110 MMHG

## 2025-08-26 DIAGNOSIS — N18.5 CHRONIC KIDNEY DISEASE, STAGE 5: ICD-10-CM

## 2025-08-26 DIAGNOSIS — G47.33 OBSTRUCTIVE SLEEP APNEA (ADULT) (PEDIATRIC): ICD-10-CM

## 2025-08-26 DIAGNOSIS — Z80.0 FAMILY HISTORY OF MALIGNANT NEOPLASM OF DIGESTIVE ORGANS: ICD-10-CM

## 2025-08-26 DIAGNOSIS — E87.20 ACIDOSIS, UNSPECIFIED: ICD-10-CM

## 2025-08-26 DIAGNOSIS — R05.3 CHRONIC COUGH: ICD-10-CM

## 2025-08-26 DIAGNOSIS — N25.1 NEPHROGENIC DIABETES INSIPIDUS: ICD-10-CM

## 2025-08-26 DIAGNOSIS — F17.200 NICOTINE DEPENDENCE, UNSPECIFIED, UNCOMPLICATED: ICD-10-CM

## 2025-08-26 PROCEDURE — G2211 COMPLEX E/M VISIT ADD ON: CPT

## 2025-08-26 PROCEDURE — 94729 DIFFUSING CAPACITY: CPT

## 2025-08-26 PROCEDURE — 94727 GAS DIL/WSHOT DETER LNG VOL: CPT

## 2025-08-26 PROCEDURE — 99214 OFFICE O/P EST MOD 30 MIN: CPT

## 2025-08-26 PROCEDURE — 99214 OFFICE O/P EST MOD 30 MIN: CPT | Mod: 25

## 2025-08-26 PROCEDURE — 99406 BEHAV CHNG SMOKING 3-10 MIN: CPT

## 2025-08-26 PROCEDURE — 94060 EVALUATION OF WHEEZING: CPT

## 2025-09-10 ENCOUNTER — APPOINTMENT (OUTPATIENT)
Dept: PULMONOLOGY | Facility: CLINIC | Age: 60
End: 2025-09-10
Payer: MEDICARE

## 2025-09-10 DIAGNOSIS — F17.200 NICOTINE DEPENDENCE, UNSPECIFIED, UNCOMPLICATED: ICD-10-CM

## 2025-09-10 PROCEDURE — G0296 VISIT TO DETERM LDCT ELIG: CPT

## 2025-09-11 VITALS — BODY MASS INDEX: 32.93 KG/M2 | WEIGHT: 230 LBS | HEIGHT: 70 IN

## 2025-09-11 DIAGNOSIS — F17.210 NICOTINE DEPENDENCE, CIGARETTES, UNCOMPLICATED: ICD-10-CM
